# Patient Record
Sex: FEMALE | Race: WHITE | NOT HISPANIC OR LATINO | Employment: UNEMPLOYED | URBAN - METROPOLITAN AREA
[De-identification: names, ages, dates, MRNs, and addresses within clinical notes are randomized per-mention and may not be internally consistent; named-entity substitution may affect disease eponyms.]

---

## 2020-07-01 ENCOUNTER — HOSPITAL ENCOUNTER (EMERGENCY)
Facility: HOSPITAL | Age: 55
Discharge: HOME | End: 2020-07-01
Attending: EMERGENCY MEDICINE | Admitting: EMERGENCY MEDICINE

## 2020-07-01 VITALS
SYSTOLIC BLOOD PRESSURE: 142 MMHG | TEMPERATURE: 99.4 F | DIASTOLIC BLOOD PRESSURE: 79 MMHG | OXYGEN SATURATION: 99 % | HEART RATE: 84 BPM | RESPIRATION RATE: 16 BRPM
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
## 2020-07-01 DIAGNOSIS — S90.562A INSECT BITE OF LEFT ANKLE, INITIAL ENCOUNTER: Primary | ICD-10-CM

## 2020-07-01 PROCEDURE — 99283 EMERGENCY DEPT VISIT LOW MDM: CPT

## 2020-07-01 RX ORDER — CEPHALEXIN 500 MG/1
500 CAPSULE ORAL 4 TIMES DAILY
Qty: 40 CAPSULE | Refills: 0 | Status: SHIPPED | OUTPATIENT
Start: 2020-07-01

## 2020-07-01 RX ORDER — PREDNISONE 20 MG/1
20 TABLET ORAL 2 TIMES DAILY
Qty: 10 TABLET | Refills: 0 | Status: SHIPPED | OUTPATIENT
Start: 2020-07-01

## 2020-07-01 NOTE — DISCHARGE INSTRUCTIONS
Return to ER for any worsening symptoms.    Use warm compresses to area.  Use over-the-counter Benadryl.

## 2020-07-01 NOTE — ED TRIAGE NOTES
Pt was stung by bee to left ankle yesterday. Reports increase pain and swelling. Pt has hx of soa and swelling in past. Pt has not taken any meds for this. No soa or distress noted    Pt in mask on arrival. Staff wearing mask and goggles on arrival

## 2020-07-01 NOTE — ED PROVIDER NOTES
Brief history of present illness: 55-year-old lady complains of swelling of the left ankle status post bee sting yesterday.  No fevers.  No chest pain or shortness of breath.    Physical exam:   Alert and appropriate.  No acute distress.  Not overtly toxic appearing.  No respiratory distress.  Pink warm and well-perfused.  Soft tissue swelling with hyperemia of the left ankle especially medially without induration or fluctuance.    MDM: Agree with plan for outpatient therapies    I have seen and personally evaluated this patient, discussed the case with the treating advanced practice provider, and reviewed their note. I was involved in the medical decision making during the evaluation, testing and disposition planning for this patient.     Suresh Samayoa MD  07/01/20 2633

## 2020-07-01 NOTE — ED PROVIDER NOTES
EMERGENCY DEPARTMENT ENCOUNTER    Room Number:  27/27  Date of encounter:  7/1/2020  PCP: Provider, No Known  Historian: Patient      HPI:  Chief Complaint: Insect bite  A complete HPI/ROS/PMH/PSH/SH/FH are unobtainable due to: Nothing    Context: Emilie Quick is a 55 y.o. female who presents to the ED c/o bumblebee sting to left medial ankle.  Patient states this occurred yesterday approximately 24 hours ago.  Patient states she immediately felt pain.  She states she started to develop surrounding erythema and warmth almost immediately.  Patient denies any chest pain, shortness of breath, throat closure.  Patient states she has been stung by bees in the past without any anaphylaxis.  She denies any fever.  Patient has been able to ambulate without difficulty.  Her tetanus shot is up-to-date.    Review of Medical Records  No previous pertinent medical records.    PAST MEDICAL HISTORY  Active Ambulatory Problems     Diagnosis Date Noted   • No Active Ambulatory Problems     Resolved Ambulatory Problems     Diagnosis Date Noted   • No Resolved Ambulatory Problems     No Additional Past Medical History         PAST SURGICAL HISTORY  No past surgical history on file.      FAMILY HISTORY  No family history on file.      SOCIAL HISTORY  Social History     Socioeconomic History   • Marital status: Single     Spouse name: Not on file   • Number of children: Not on file   • Years of education: Not on file   • Highest education level: Not on file         ALLERGIES  Ampicillin and Penicillins        REVIEW OF SYSTEMS  All systems reviewed and negative except for those discussed in HPI.       PHYSICAL EXAM    I have reviewed the triage vital signs and nursing notes.    ED Triage Vitals [07/01/20 1718]   Temp Heart Rate Resp BP SpO2   99.4 °F (37.4 °C) 101 16 141/84 98 %      Temp src Heart Rate Source Patient Position BP Location FiO2 (%)   Tympanic Monitor Sitting -- --       Physical Exam  GENERAL: Alert, oriented,  not distressed  HENT: nares patent, head atraumatic  EYES: no scleral icterus, EOMI  CV: regular rhythm, regular rate, no murmur  RESPIRATORY: normal effort, CTA  ABDOMEN: soft, nontender  MUSCULOSKELETAL: Mild erythema, swelling, tenderness surrounding left medial ankle.  No visible foreign body.  No fluctuance.  No lymphangitis.  Distal pulses intact.  NEURO: alert, moves all extremities, follows commands  SKIN: warm, dry      PROGRESS, DATA ANALYSIS, CONSULTS, AND MEDICAL DECISION MAKING    All labs have been independently reviewed by me.  All radiology studies have been reviewed by me and discussed with radiologist dictating the report.   EKG's independently viewed and interpreted by me.  Discussion below represents my analysis of pertinent findings related to patient's condition, differential diagnosis, treatment plan and final disposition.    I have discussed case with Dr. Samayoa, emergency room physician.  He has performed his own bedside examination and agrees with treatment plan.    ED Course as of Jul 01 2351 Wed Jul 01, 2020   1832 Patient reports being stung by bumblebee yesterday.  Patient has mild swelling and erythema to surrounding area.  No dyspnea, fever.  Differential diagnosis includes but not limited to cellulitis versus localized inflammation.  No evidence of sepsis or anaphylaxis.  Will treat with p.o. steroids and antibiotics, as well as warm compresses and over-the-counter Benadryl.    [EE]      ED Course User Index  [EE] Robert Katz PA       AS OF 23:51 VITALS:    BP - 142/79  HR - 84  TEMP - 99.4 °F (37.4 °C) (Tympanic)  O2 SATS - 99%        DIAGNOSIS  Final diagnoses:   Insect bite of left ankle, initial encounter         DISPOSITION  Discharged             Robert Katz PA  07/01/20 0932